# Patient Record
Sex: MALE | Race: WHITE | NOT HISPANIC OR LATINO | Employment: UNEMPLOYED | URBAN - METROPOLITAN AREA
[De-identification: names, ages, dates, MRNs, and addresses within clinical notes are randomized per-mention and may not be internally consistent; named-entity substitution may affect disease eponyms.]

---

## 2017-12-26 ENCOUNTER — HOSPITAL ENCOUNTER (EMERGENCY)
Facility: HOSPITAL | Age: 37
Discharge: HOME | End: 2017-12-26
Payer: MEDICAID

## 2017-12-26 VITALS
BODY MASS INDEX: 35.36 KG/M2 | HEART RATE: 108 BPM | TEMPERATURE: 101 F | OXYGEN SATURATION: 98 % | SYSTOLIC BLOOD PRESSURE: 118 MMHG | DIASTOLIC BLOOD PRESSURE: 64 MMHG | WEIGHT: 220 LBS | RESPIRATION RATE: 20 BRPM | HEIGHT: 66 IN

## 2017-12-26 DIAGNOSIS — B34.9 VIRAL ILLNESS: Primary | ICD-10-CM

## 2017-12-26 LAB
DEPRECATED S PYO AG THROAT QL EIA: NEGATIVE
FLUAV AG SPEC QL IA: NEGATIVE
FLUBV AG SPEC QL IA: NEGATIVE
SPECIMEN SOURCE: NORMAL

## 2017-12-26 PROCEDURE — 87081 CULTURE SCREEN ONLY: CPT

## 2017-12-26 PROCEDURE — 87880 STREP A ASSAY W/OPTIC: CPT

## 2017-12-26 PROCEDURE — 99283 EMERGENCY DEPT VISIT LOW MDM: CPT

## 2017-12-26 PROCEDURE — 25000003 PHARM REV CODE 250: Performed by: NURSE PRACTITIONER

## 2017-12-26 PROCEDURE — 87400 INFLUENZA A/B EACH AG IA: CPT | Mod: 59

## 2017-12-26 RX ORDER — ONDANSETRON 4 MG/1
4 TABLET, ORALLY DISINTEGRATING ORAL
Status: COMPLETED | OUTPATIENT
Start: 2017-12-26 | End: 2017-12-26

## 2017-12-26 RX ORDER — BUPRENORPHINE AND NALOXONE 8; 2 MG/1; MG/1
FILM, SOLUBLE BUCCAL; SUBLINGUAL EVERY 6 HOURS PRN
COMMUNITY

## 2017-12-26 RX ORDER — GUAIFENESIN 100 MG/5ML
100-200 SOLUTION ORAL EVERY 4 HOURS PRN
Qty: 118 ML | Refills: 0 | Status: SHIPPED | OUTPATIENT
Start: 2017-12-26 | End: 2018-01-05

## 2017-12-26 RX ORDER — IBUPROFEN 600 MG/1
600 TABLET ORAL
Status: COMPLETED | OUTPATIENT
Start: 2017-12-26 | End: 2017-12-26

## 2017-12-26 RX ORDER — ONDANSETRON 4 MG/1
8 TABLET, ORALLY DISINTEGRATING ORAL EVERY 6 HOURS PRN
Qty: 12 TABLET | Refills: 0 | OUTPATIENT
Start: 2017-12-26 | End: 2018-08-27

## 2017-12-26 RX ADMIN — ONDANSETRON 4 MG: 4 TABLET, ORALLY DISINTEGRATING ORAL at 02:12

## 2017-12-26 RX ADMIN — IBUPROFEN 600 MG: 600 TABLET, FILM COATED ORAL at 02:12

## 2017-12-26 NOTE — ED NOTES
Patient states he has headache, fever, n/v/d since last night.    LOC: The patient is awake and alert; oriented x 3 and speaking appropriately.  APPEARANCE: Patient resting comfortably, patient is clean and well groomed  SKIN: warm and dry, normal skin turgor & moist mucus membranes, skin intact, no breakdown noted.  MUSCULOSKELETAL: Patient moving all extremities well, no obvious swelling or deformities noted  RESPIRATORY: Airway is open and patent, breath sounds clear throughout all lung fields; respirations are spontaneous, normal effort and rate  CARDIAC: Patient has a normal rate, no peripheral edema noted, capillary refill < 3 seconds; No complaints of chest pain   ABDOMEN: Soft and non tender to palpation, no distention noted. Bowel sounds present x 4.  N/V/D since last night.

## 2017-12-26 NOTE — ED PROVIDER NOTES
Encounter Date: 12/26/2017       History     Chief Complaint   Patient presents with    Emesis     Pt states started with vomiting, chills and fever last pm.  Took 2000mg tylenol approx 1 hour PTA.      37-year-old male presents to the emergency room complaining of vomiting, body aches, fever, cough and congestion for the past 2 days.  States his daughter was seen 3 days ago for a viral illness.  Has been taking Tylenol with little relief.  Patient reports vomiting once today.  Reports generalized abdominal cramping.  Denies any diarrhea.  Denies blood in vomit or stool.          Review of patient's allergies indicates:  No Known Allergies  History reviewed. No pertinent past medical history.  No past surgical history on file.  History reviewed. No pertinent family history.  Social History   Substance Use Topics    Smoking status: Not on file    Smokeless tobacco: Not on file    Alcohol use Not on file     Review of Systems   Constitutional: Positive for chills, fatigue and fever.   HENT: Positive for congestion. Negative for sore throat.    Respiratory: Positive for cough. Negative for shortness of breath.    Cardiovascular: Negative for chest pain.   Gastrointestinal: Positive for nausea and vomiting.   Genitourinary: Negative for dysuria.   Musculoskeletal: Negative for back pain.   Skin: Negative for rash.   Neurological: Negative for weakness.   Hematological: Does not bruise/bleed easily.   All other systems reviewed and are negative.      Physical Exam     Initial Vitals [12/26/17 1411]   BP Pulse Resp Temp SpO2   118/64 108 20 (!) 101.4 °F (38.6 °C) 98 %      MAP       82         Physical Exam    Nursing note and vitals reviewed.  Constitutional: He appears well-developed and well-nourished. He is not diaphoretic. No distress.   HENT:   Head: Normocephalic and atraumatic.   Right Ear: Tympanic membrane normal.   Left Ear: Tympanic membrane normal.   Nose: Rhinorrhea present.   Eyes: Conjunctivae are  normal.   Neck: Normal range of motion. Neck supple.   Cardiovascular: Normal rate and regular rhythm.   Pulmonary/Chest: Breath sounds normal. No respiratory distress. He exhibits no tenderness.   Abdominal: Soft. He exhibits no distension. There is no tenderness.   Musculoskeletal: Normal range of motion. He exhibits no tenderness.   Neurological: He is alert and oriented to person, place, and time. He has normal strength. No cranial nerve deficit or sensory deficit.   Skin: Skin is warm and dry. Capillary refill takes less than 2 seconds.   Psychiatric: He has a normal mood and affect. His behavior is normal. Judgment and thought content normal.         ED Course   Procedures  Labs Reviewed   THROAT SCREEN, RAPID   CULTURE, STREP A,  THROAT   INFLUENZA A AND B ANTIGEN             Medical Decision Making:   Initial Assessment:   37-year-old male presents to the emergency room complaining of vomiting, body aches, fever, cough and congestion for the past 2 days.  States his daughter was seen 3 days ago for a viral illness.  Has been taking Tylenol with little relief.  Patient reports vomiting once today.  Reports generalized abdominal cramping.  Denies any diarrhea.  Denies blood in vomit or stool.  Differential Diagnosis:   Gastritis, viral illness colitis, appendicitis, IBS, strep throat, ileus, sepsis  Clinical Tests:   Lab Tests: Ordered and Reviewed  ED Management:  Strep screen negative.  Influenza screen is negative.  Patient tolerated fluids well in the emergency room.  Temp improved with medication.  Instructed to hydrate well take NSAID Tylenol or Motrin as needed for fever.  Prescribe Zofran and a cough medication.  Patient instructed to follow-up with primary care doctor in 2-3 days                   ED Course      Clinical Impression:   The encounter diagnosis was Viral illness.                           Sofie Arango NP  12/27/17 [id number]

## 2017-12-29 LAB — BACTERIA THROAT CULT: NORMAL

## 2018-01-18 ENCOUNTER — HOSPITAL ENCOUNTER (EMERGENCY)
Facility: HOSPITAL | Age: 38
Discharge: HOME | End: 2018-01-18
Attending: EMERGENCY MEDICINE
Payer: MEDICAID

## 2018-01-18 VITALS
DIASTOLIC BLOOD PRESSURE: 73 MMHG | HEIGHT: 66 IN | RESPIRATION RATE: 18 BRPM | HEART RATE: 101 BPM | TEMPERATURE: 101 F | WEIGHT: 225 LBS | BODY MASS INDEX: 36.16 KG/M2 | OXYGEN SATURATION: 96 % | SYSTOLIC BLOOD PRESSURE: 131 MMHG

## 2018-01-18 DIAGNOSIS — J01.90 ACUTE NON-RECURRENT SINUSITIS, UNSPECIFIED LOCATION: Primary | ICD-10-CM

## 2018-01-18 PROCEDURE — 99283 EMERGENCY DEPT VISIT LOW MDM: CPT

## 2018-01-18 RX ORDER — AMOXICILLIN 500 MG/1
500 CAPSULE ORAL 3 TIMES DAILY
Qty: 21 CAPSULE | Refills: 0 | Status: SHIPPED | OUTPATIENT
Start: 2018-01-18 | End: 2018-01-25

## 2018-01-18 RX ORDER — PREDNISONE 20 MG/1
40 TABLET ORAL DAILY
Qty: 10 TABLET | Refills: 0 | Status: SHIPPED | OUTPATIENT
Start: 2018-01-18 | End: 2018-01-23

## 2018-01-18 NOTE — ED PROVIDER NOTES
Encounter Date: 1/18/2018       History     Chief Complaint   Patient presents with    Fever     PT reports fever, body aches and sinus pressure behind his eyes since yesterday. Motrin taken a hour ago    Generalized Body Aches     The history is provided by the patient.   Sinusitis    This is a new problem. The current episode started yesterday. The problem has been unchanged. The pain is at a severity of 3/10. The pain has been fluctuating since onset. Associated symptoms include chills, congestion and sinus pressure. Pertinent negatives include no sweats, no ear pain, no sore throat, no swollen glands, no cough and no shortness of breath. He has tried nothing for the symptoms.     Review of patient's allergies indicates:  No Known Allergies  History reviewed. No pertinent past medical history.  History reviewed. No pertinent surgical history.  History reviewed. No pertinent family history.  Social History   Substance Use Topics    Smoking status: Current Every Day Smoker     Packs/day: 1.00     Types: Cigarettes    Smokeless tobacco: Never Used    Alcohol use No     Review of Systems   Constitutional: Positive for chills.   HENT: Positive for congestion and sinus pressure. Negative for ear pain and sore throat.    Respiratory: Negative for cough and shortness of breath.    All other systems reviewed and are negative.      Physical Exam     Initial Vitals [01/18/18 1451]   BP Pulse Resp Temp SpO2   131/73 101 18 (!) 100.6 °F (38.1 °C) 96 %      MAP       92.33         Physical Exam    Nursing note and vitals reviewed.  Constitutional: He appears well-developed and well-nourished.   HENT:   Head: Normocephalic and atraumatic.   Right Ear: Hearing, tympanic membrane, external ear and ear canal normal.   Left Ear: Hearing, tympanic membrane, external ear and ear canal normal.   Nose: Mucosal edema and rhinorrhea present. No septal deviation or nasal septal hematoma. No epistaxis.   Mouth/Throat: Uvula is  midline.   Eyes: EOM are normal.   Neck: Normal range of motion. Neck supple.   Cardiovascular: Normal rate, regular rhythm, normal heart sounds and intact distal pulses.   Pulmonary/Chest: Breath sounds normal.   Abdominal: Soft.   Musculoskeletal: Normal range of motion.   Neurological: He is alert and oriented to person, place, and time.   Skin: Skin is warm and dry. Capillary refill takes less than 2 seconds.   Psychiatric: He has a normal mood and affect. His behavior is normal. Judgment and thought content normal.         ED Course   Procedures  Labs Reviewed - No data to display                            ED Course      Clinical Impression:   The encounter diagnosis was Acute non-recurrent sinusitis, unspecified location.    Disposition:   Disposition: Discharged  Condition: Stable                        Maricruz Giordano MD  01/18/18 6295

## 2018-01-18 NOTE — ED TRIAGE NOTES
PT reports fever, body aches and sinus pressure behind his eyes since yesterday. Motrin taken a hour ago

## 2018-01-18 NOTE — ED NOTES
Patient states he has fever and generalized body aches since last night.  Motrin last taken at 1400.  He denies any other symptoms.    LOC: The patient is awake and alert; oriented x 3 and speaking appropriately.  APPEARANCE: Patient resting comfortably, patient is clean and well groomed  SKIN: warm and dry, normal skin turgor & moist mucus membranes, skin intact, no breakdown noted.  MUSCULOSKELETAL: Patient moving all extremities well, no obvious swelling or deformities noted  RESPIRATORY: Airway is open and patent, breath sounds clear throughout all lung fields; respirations are spontaneous, normal effort and rate  CARDIAC: Patient has a normal rate, no peripheral edema noted, capillary refill < 3 seconds; No complaints of chest pain   ABDOMEN: Soft and non tender to palpation, no distention noted. Bowel sounds present x 4

## 2018-08-27 ENCOUNTER — HOSPITAL ENCOUNTER (EMERGENCY)
Facility: HOSPITAL | Age: 38
Discharge: HOME | End: 2018-08-27
Attending: SURGERY
Payer: MEDICAID

## 2018-08-27 VITALS
WEIGHT: 225 LBS | OXYGEN SATURATION: 97 % | DIASTOLIC BLOOD PRESSURE: 87 MMHG | BODY MASS INDEX: 36.32 KG/M2 | TEMPERATURE: 98 F | RESPIRATION RATE: 16 BRPM | SYSTOLIC BLOOD PRESSURE: 141 MMHG | HEART RATE: 82 BPM

## 2018-08-27 DIAGNOSIS — M53.3 COCCYX PAIN: Primary | ICD-10-CM

## 2018-08-27 DIAGNOSIS — R52 PAIN: ICD-10-CM

## 2018-08-27 PROCEDURE — 63600175 PHARM REV CODE 636 W HCPCS: Performed by: NURSE PRACTITIONER

## 2018-08-27 PROCEDURE — 96372 THER/PROPH/DIAG INJ SC/IM: CPT

## 2018-08-27 PROCEDURE — 25000003 PHARM REV CODE 250: Performed by: NURSE PRACTITIONER

## 2018-08-27 PROCEDURE — 99283 EMERGENCY DEPT VISIT LOW MDM: CPT | Mod: 25

## 2018-08-27 RX ORDER — KETOROLAC TROMETHAMINE 30 MG/ML
30 INJECTION, SOLUTION INTRAMUSCULAR; INTRAVENOUS
Status: COMPLETED | OUTPATIENT
Start: 2018-08-27 | End: 2018-08-27

## 2018-08-27 RX ORDER — DEXAMETHASONE SODIUM PHOSPHATE 4 MG/ML
4 INJECTION, SOLUTION INTRA-ARTICULAR; INTRALESIONAL; INTRAMUSCULAR; INTRAVENOUS; SOFT TISSUE ONCE
Status: DISCONTINUED | OUTPATIENT
Start: 2018-08-27 | End: 2018-08-27

## 2018-08-27 RX ORDER — METHYLPREDNISOLONE 4 MG/1
TABLET ORAL
Qty: 1 PACKAGE | Refills: 0 | Status: SHIPPED | OUTPATIENT
Start: 2018-08-27 | End: 2018-09-17

## 2018-08-27 RX ORDER — SYRING-NEEDL,DISP,INSUL,0.3 ML 29 G X1/2"
148 SYRINGE, EMPTY DISPOSABLE MISCELLANEOUS
Status: COMPLETED | OUTPATIENT
Start: 2018-08-27 | End: 2018-08-27

## 2018-08-27 RX ORDER — DEXAMETHASONE SODIUM PHOSPHATE 4 MG/ML
4 INJECTION, SOLUTION INTRA-ARTICULAR; INTRALESIONAL; INTRAMUSCULAR; INTRAVENOUS; SOFT TISSUE ONCE
Status: COMPLETED | OUTPATIENT
Start: 2018-08-27 | End: 2018-08-27

## 2018-08-27 RX ADMIN — KETOROLAC TROMETHAMINE 30 MG: 30 INJECTION, SOLUTION INTRAMUSCULAR at 02:08

## 2018-08-27 RX ADMIN — Medication 148 ML: at 03:08

## 2018-08-27 RX ADMIN — DEXAMETHASONE SODIUM PHOSPHATE 4 MG: 4 INJECTION, SOLUTION INTRAMUSCULAR; INTRAVENOUS at 02:08

## 2018-08-27 NOTE — DISCHARGE INSTRUCTIONS
May also take Tylenol and ibuprofen as needed for pain.  Take 1/2 of bottle of Mag citrate given to at discharge today.  If no bowel movement by tomorrow morning, take 2nd half a bottle.  Make sure to drink plenty of water and incorporate fibrin tear diet which includes fruits, vegetables, and beans.

## 2018-08-27 NOTE — ED PROVIDER NOTES
Encounter Date: 8/27/2018       History     Chief Complaint   Patient presents with    Back Pain     lower back pain chronic, due to an injury at age 16. tgis episode of pain started 3 days ago     38-year-old male here today complaining of coccyx pain.  Patient reports a longstanding history of coccyx pain related to a previous injury. He states however, the pain has been worse over the last 3 days.  He denies any inciting event or injury that could cause the pain. Patient reports he does get pain relief with ibuprofen, his prescribed Suboxone, as well as ice.  Patient denies any abdominal pain, urinary complaints, or pain radiation to his bilateral lower extremities. Patient's significant other, who is also at bedside, reports her repeat concerned about patient having a cyst to his coccyx area.          Review of patient's allergies indicates:  No Known Allergies  History reviewed. No pertinent past medical history.  History reviewed. No pertinent surgical history.  History reviewed. No pertinent family history.  Social History     Tobacco Use    Smoking status: Current Every Day Smoker     Packs/day: 1.00     Types: Cigarettes    Smokeless tobacco: Never Used   Substance Use Topics    Alcohol use: No    Drug use: No     Review of Systems   Respiratory: Negative for shortness of breath.    Cardiovascular: Negative for chest pain.   Gastrointestinal: Positive for constipation. Negative for abdominal pain, nausea and vomiting.   Musculoskeletal: Positive for back pain. Negative for gait problem.   Skin: Negative for wound.   All other systems reviewed and are negative.      Physical Exam     Initial Vitals [08/27/18 1417]   BP Pulse Resp Temp SpO2   (!) 141/87 82 16 98.2 °F (36.8 °C) 97 %      MAP       --         Physical Exam    Nursing note and vitals reviewed.  Constitutional: He appears well-developed and well-nourished. No distress.   HENT:   Head: Normocephalic and atraumatic.   Right Ear: External ear  normal.   Left Ear: External ear normal.   Nose: Nose normal.   Mouth/Throat: Oropharynx is clear and moist.   Eyes: Conjunctivae and EOM are normal.   Neck: Normal range of motion.   Cardiovascular: Intact distal pulses.   No edema to bilateral lower extremities.   Pulmonary/Chest: No respiratory distress.   Abdominal: Soft. There is no tenderness.   Musculoskeletal:   No thoracic or lumbar vertebral tenderness, step-off, or crepitus. Mild coccyx tenderness without crepitus or swelling.   Neurological: He is alert and oriented to person, place, and time.   Skin: Skin is warm and dry.   Psychiatric: He has a normal mood and affect. His behavior is normal. Thought content normal.         ED Course   Procedures  Labs Reviewed - No data to display       Imaging Results          X-Ray Sacrum And Coccyx (Final result)  Result time 08/27/18 14:45:02    Final result by Sandip Nunn MD (08/27/18 14:45:02)                 Impression:      No acute fracture or dislocation.    See above.      Electronically signed by: Sandip Nunn MD  Date:    08/27/2018  Time:    14:45             Narrative:    EXAMINATION:  XR SACRUM AND COCCYX    CLINICAL HISTORY:  XR SACRUM AND COCCYXPain, unspecified    COMPARISON:  None    FINDINGS:  Three views of the sacrum/coccyx were obtained.    No evidence of acute fracture or dislocation.  Bony mineralization is normal.  Soft tissues are unremarkable.   Moderate constipation.  Chronic appearing deformity at the tip of the sacrum without acute fracture identified.                                 Medical Decision Making:   Initial Assessment:   Chronic coccyx pain, worse the past 2-3 days.    Differential Diagnosis:   Coccyx fracture, constipation, UTI, coccydynia  Clinical Tests:   Radiological Study: Ordered and Reviewed  ED Management:  No acute appearing abnormality seen on imaging.  Patient does have moderate constipation on imaging.  Discussion with patient reports that patient has been  feeling constipated.  Believe constipation is a factor and patient increase coccyx pain.  Patient received Decadron here in the ED for inflammation and will be started on a Medrol Dosepak which he is to start tomorrow.  Patient also given bottle of Mag citrate with instructions.  Patient to follow up with his primary care provider if symptoms persist or return to ER if symptoms worsen or change.  Patient verbalized agreement and understanding of treatment plan prior to discharge.                      Clinical impression:    1.  Coccyx pain.  2.  Constipation.                             Hyacinth Hassan NP  08/27/18 5600

## 2018-08-27 NOTE — ED NOTES
C/o tail bone pain x3 days. Denies any recent injury. Pt has chronic pain to this area due to accident 15 years ago. Taking motrin at home with relief. Ambulates with steady gate.

## 2018-09-06 ENCOUNTER — HOSPITAL ENCOUNTER (EMERGENCY)
Facility: HOSPITAL | Age: 38
Discharge: HOME | End: 2018-09-06
Attending: EMERGENCY MEDICINE
Payer: MEDICAID

## 2018-09-06 VITALS
TEMPERATURE: 99 F | DIASTOLIC BLOOD PRESSURE: 71 MMHG | OXYGEN SATURATION: 99 % | RESPIRATION RATE: 18 BRPM | HEART RATE: 88 BPM | SYSTOLIC BLOOD PRESSURE: 116 MMHG

## 2018-09-06 DIAGNOSIS — L02.91 ABSCESS: Primary | ICD-10-CM

## 2018-09-06 LAB
ALBUMIN SERPL BCP-MCNC: 3.8 G/DL
ALP SERPL-CCNC: 65 U/L
ALT SERPL W/O P-5'-P-CCNC: 27 U/L
ANION GAP SERPL CALC-SCNC: 9 MMOL/L
AST SERPL-CCNC: 19 U/L
BASOPHILS # BLD AUTO: 0.06 K/UL
BASOPHILS NFR BLD: 0.4 %
BILIRUB SERPL-MCNC: 0.3 MG/DL
BUN SERPL-MCNC: 19 MG/DL
BUN SERPL-MCNC: 21 MG/DL (ref 6–30)
CALCIUM SERPL-MCNC: 9.8 MG/DL
CHLORIDE SERPL-SCNC: 102 MMOL/L
CHLORIDE SERPL-SCNC: 99 MMOL/L (ref 95–110)
CO2 SERPL-SCNC: 26 MMOL/L
CREAT SERPL-MCNC: 1 MG/DL
CREAT SERPL-MCNC: 1 MG/DL (ref 0.5–1.4)
DIFFERENTIAL METHOD: ABNORMAL
EOSINOPHIL # BLD AUTO: 0.3 K/UL
EOSINOPHIL NFR BLD: 2 %
ERYTHROCYTE [DISTWIDTH] IN BLOOD BY AUTOMATED COUNT: 13.3 %
EST. GFR  (AFRICAN AMERICAN): >60 ML/MIN/1.73 M^2
EST. GFR  (NON AFRICAN AMERICAN): >60 ML/MIN/1.73 M^2
GLUCOSE SERPL-MCNC: 98 MG/DL
GLUCOSE SERPL-MCNC: 98 MG/DL (ref 70–110)
HCT VFR BLD AUTO: 38.6 %
HCT VFR BLD CALC: 37 %PCV (ref 36–54)
HGB BLD-MCNC: 12.8 G/DL
IMM GRANULOCYTES # BLD AUTO: 0.03 K/UL
IMM GRANULOCYTES NFR BLD AUTO: 0.2 %
LACTATE SERPL-SCNC: 0.9 MMOL/L
LYMPHOCYTES # BLD AUTO: 4.8 K/UL
LYMPHOCYTES NFR BLD: 32.9 %
MCH RBC QN AUTO: 29 PG
MCHC RBC AUTO-ENTMCNC: 33.2 G/DL
MCV RBC AUTO: 88 FL
MONOCYTES # BLD AUTO: 1.4 K/UL
MONOCYTES NFR BLD: 9.5 %
NEUTROPHILS # BLD AUTO: 8 K/UL
NEUTROPHILS NFR BLD: 55 %
NRBC BLD-RTO: 0 /100 WBC
PLATELET # BLD AUTO: 348 K/UL
PMV BLD AUTO: 9.7 FL
POC IONIZED CALCIUM: 1.15 MMOL/L (ref 1.06–1.42)
POC TCO2 (MEASURED): 26 MMOL/L (ref 23–29)
POTASSIUM BLD-SCNC: 4 MMOL/L (ref 3.5–5.1)
POTASSIUM SERPL-SCNC: 4 MMOL/L
PROT SERPL-MCNC: 7.6 G/DL
RBC # BLD AUTO: 4.41 M/UL
SAMPLE: NORMAL
SODIUM BLD-SCNC: 137 MMOL/L (ref 136–145)
SODIUM SERPL-SCNC: 137 MMOL/L
WBC # BLD AUTO: 14.52 K/UL

## 2018-09-06 PROCEDURE — 99284 EMERGENCY DEPT VISIT MOD MDM: CPT | Mod: 25,,, | Performed by: EMERGENCY MEDICINE

## 2018-09-06 PROCEDURE — 96365 THER/PROPH/DIAG IV INF INIT: CPT

## 2018-09-06 PROCEDURE — 96366 THER/PROPH/DIAG IV INF ADDON: CPT

## 2018-09-06 PROCEDURE — 63600175 PHARM REV CODE 636 W HCPCS: Performed by: EMERGENCY MEDICINE

## 2018-09-06 PROCEDURE — 25000003 PHARM REV CODE 250: Performed by: EMERGENCY MEDICINE

## 2018-09-06 PROCEDURE — 80053 COMPREHEN METABOLIC PANEL: CPT

## 2018-09-06 PROCEDURE — 25500020 PHARM REV CODE 255: Performed by: EMERGENCY MEDICINE

## 2018-09-06 PROCEDURE — 85025 COMPLETE CBC W/AUTO DIFF WBC: CPT

## 2018-09-06 PROCEDURE — 87040 BLOOD CULTURE FOR BACTERIA: CPT | Mod: 59

## 2018-09-06 PROCEDURE — 10061 I&D ABSCESS COMP/MULTIPLE: CPT

## 2018-09-06 PROCEDURE — 10061 I&D ABSCESS COMP/MULTIPLE: CPT | Mod: ,,, | Performed by: EMERGENCY MEDICINE

## 2018-09-06 PROCEDURE — 99284 EMERGENCY DEPT VISIT MOD MDM: CPT | Mod: 25

## 2018-09-06 PROCEDURE — 83605 ASSAY OF LACTIC ACID: CPT

## 2018-09-06 RX ORDER — SULFAMETHOXAZOLE AND TRIMETHOPRIM 800; 160 MG/1; MG/1
2 TABLET ORAL 2 TIMES DAILY
Qty: 40 TABLET | Refills: 0 | Status: SHIPPED | OUTPATIENT
Start: 2018-09-06

## 2018-09-06 RX ORDER — VANCOMYCIN 2 GRAM/500 ML IN 0.9 % SODIUM CHLORIDE INTRAVENOUS
2000
Status: COMPLETED | OUTPATIENT
Start: 2018-09-06 | End: 2018-09-06

## 2018-09-06 RX ORDER — LIDOCAINE HYDROCHLORIDE 10 MG/ML
10 INJECTION, SOLUTION EPIDURAL; INFILTRATION; INTRACAUDAL; PERINEURAL
Status: COMPLETED | OUTPATIENT
Start: 2018-09-06 | End: 2018-09-06

## 2018-09-06 RX ORDER — CEPHALEXIN 500 MG/1
500 CAPSULE ORAL 4 TIMES DAILY
Qty: 40 CAPSULE | Refills: 0 | Status: SHIPPED | OUTPATIENT
Start: 2018-09-06 | End: 2018-09-16

## 2018-09-06 RX ADMIN — IOHEXOL 100 ML: 350 INJECTION, SOLUTION INTRAVENOUS at 04:09

## 2018-09-06 RX ADMIN — SODIUM CHLORIDE 1000 ML: 0.9 INJECTION, SOLUTION INTRAVENOUS at 04:09

## 2018-09-06 RX ADMIN — VANCOMYCIN HYDROCHLORIDE 2000 MG: 10 INJECTION, POWDER, LYOPHILIZED, FOR SOLUTION INTRAVENOUS at 04:09

## 2018-09-06 RX ADMIN — LIDOCAINE HYDROCHLORIDE 100 MG: 10 INJECTION, SOLUTION EPIDURAL; INFILTRATION; INTRACAUDAL; PERINEURAL at 05:09

## 2018-09-06 NOTE — ED TRIAGE NOTES
Pt presents to ED c/o abscess to left gluteus. Pt reports that it has been present for about 10 days. States that pain and redness is spreading to left hip. Denies fever or chills.     LOC: Patient name and date of birth verified.  The patient is awake, alert and aware of environment with an appropriate affect, the patient is oriented x 3 and speaking appropriately.  Pt in NAD.    APPEARANCE: Patient resting comfortably and in no acute distress, patient is clean and well groomed, patient's clothing is properly fastened.  SKIN: The skin is warm and dry, color consistent with ethnicity, patient has normal skin turgor and moist mucus membranes, skin intact, no breakdown or brusing noted. Abscess to left gluteus. Redness and swelling noted.  MUSCULOSKELETAL: Patient moving all extremities well, no obvious swelling or deformities noted.  RESPIRATORY: Airway is open and patent, respirations are spontaneous, patient has a normal effort and rate, no accessory muscle use noted.  CARDIAC: Patient has a normal rate and rhythm, no periphreal edema noted, capillary refill < 3 seconds.  ABDOMEN: Soft and non tender to palpation, no distention noted. Bowel sounds present in all four quadrants.  NEUROLOGIC: Eyes open spontaneously, behavior appropriate to situation, follows commands, facial expression symmetrical, bilateral hand grasp equal and even, purposeful motor response noted, normal sensation in all extremities when touched with a finger.

## 2018-09-06 NOTE — ED PROVIDER NOTES
Encounter Date: 9/6/2018  SCRIBE #1 NOTE: I, Matthew Vega, am scribing for, and in the presence of,  Giuseppe Daniel MD.  I, Dr. Bigg Daneil, personally performed the services described in this documentation. All medical record entries made by the scribe were at my direction and in my presence.  I have reviewed the chart and agree that the record reflects my personal performance and is accurate and complete.  Bigg Daniel MD.  5:52 AM 09/06/2018         History     Chief Complaint   Patient presents with    Cyst     Pt presents to ED c/o cyst to tailbone x 10 days. States pain and swelling is progressing to L hip.        The patient is a 38 y.o. male who presents to the ED with a complaint of cyst to buttocks area. He reports it has been present for 10 days, painful to touch, worse with sitting on the area. He states it has been progressively large and painful across his entire left buttocks. The patient denies fever or vomiting.            Review of patient's allergies indicates:  No Known Allergies  History reviewed. No pertinent past medical history.  No past surgical history on file.  No family history on file.  Social History     Tobacco Use    Smoking status: Current Every Day Smoker     Packs/day: 1.00     Types: Cigarettes    Smokeless tobacco: Never Used   Substance Use Topics    Alcohol use: No    Drug use: No     Review of Systems   Constitutional: Negative for fever.   HENT: Negative for sore throat.    Respiratory: Negative for shortness of breath.    Cardiovascular: Negative for chest pain.   Gastrointestinal: Negative for nausea.   Genitourinary: Negative for dysuria.   Musculoskeletal: Negative for back pain.   Skin: Negative for rash.        Left buttocks   Neurological: Negative for weakness.   Hematological: Does not bruise/bleed easily.       Physical Exam     Initial Vitals [09/06/18 0340]   BP Pulse Resp Temp SpO2   134/81 102 18 98.9 °F (37.2 °C) 97 %      MAP       --         Physical  Exam    Nursing note and vitals reviewed.  Constitutional: He appears well-developed and well-nourished.   HENT:   Head: Normocephalic and atraumatic.   Eyes: EOM are normal. Pupils are equal, round, and reactive to light.   Neck: Normal range of motion.   Cardiovascular: Normal rate and regular rhythm.   Pulmonary/Chest: Breath sounds normal.   Abdominal: Soft. There is no tenderness.   Genitourinary:   Genitourinary Comments: Left gluteal cleft with induration and fluctuance, there is induration that extends to much of the left gluteus thierno, no visible head, open wound or discharge, there is no perirectal tenderness or induration,    Neurological: He is alert and oriented to person, place, and time.   Skin: Skin is warm and dry.   Psychiatric: He has a normal mood and affect. Thought content normal.         ED Course   I & D - Incision and Drainage  Date/Time: 2018 5:08 AM  Performed by: Giuseppe Daniel MD  Authorized by: Giuseppe Daniel MD   Consent Done: Yes  Consent: Verbal consent obtained.  Risks and benefits: risks, benefits and alternatives were discussed  Consent given by: patient  Patient identity confirmed: name,  and verbally with patient  Type: abscess  Body area: anogenital  Location details: gluteal cleft  Anesthesia: local infiltration    Anesthesia:  Local Anesthetic: lidocaine 1% with epinephrine  Anesthetic total: 3 mL  Scalpel size: 11  Incision type: single straight  Complexity: complex  Drainage: pus  Drainage amount: moderate  Wound treatment: incision,  drainage and  wound packed  Packing material: 1/4 in iodoform gauze  Complications: No  Patient tolerance: Patient tolerated the procedure well with no immediate complications        Labs Reviewed   CBC W/ AUTO DIFFERENTIAL - Abnormal; Notable for the following components:       Result Value    WBC 14.52 (*)     RBC 4.41 (*)     Hemoglobin 12.8 (*)     Hematocrit 38.6 (*)     Gran # (ANC) 8.0 (*)     Mono # 1.4 (*)     All other  components within normal limits   CULTURE, BLOOD   CULTURE, BLOOD   COMPREHENSIVE METABOLIC PANEL   LACTIC ACID, PLASMA   ISTAT PROCEDURE   ISTAT CHEM8          Imaging Results          CT Pelvis With Contrast (Final result)  Result time 09/06/18 05:19:22    Final result by Helio Kulkarni MD (09/06/18 05:19:22)                 Impression:      Nonspecific subcutaneous soft tissue density with mild surrounding soft tissue induration within the superior aspect of the gluteal cleft with dimensions as provided above.  This measures slightly greater than that of simple fluid.  Findings are nonspecific and could represent soft tissue infection/phlegmon in the appropriate clinical setting.  Additional considerations would include but are not limited to pilonidal sinus/cyst or subcutaneous/sebaceous cyst.  No evidence of associated intra-abdominal process.    Electronically signed by resident: Carlos Rosario  Date:    09/06/2018  Time:    04:42    Electronically signed by: Helio Kulkarni MD  Date:    09/06/2018  Time:    05:19             Narrative:    EXAMINATION:  CT PELVIS WITH  CONTRAST    CLINICAL HISTORY:  gluteal abscess;    TECHNIQUE:  CT evaluation of the pelvis after the administration of 100 cc intravenous Omnipaque 350.  No oral contrast administered.    COMPARISON:  Sacral radiograph 08/27/2018, CT abdomen pelvis 12/23/2011    FINDINGS:  There is a focal abnormal region of increased soft tissue density within the midline superior gluteal cleft, slightly asymmetric to the left.  There is mild surrounding soft tissue induration.  This measures approximately 3.0 x 3.4 x 4.6 cm (AP by transverse by craniocaudal dimensions.).  This measures greater than that of simple fluid.  No significant peripheral enhancement is appreciated.  No associated subcutaneous gas is present.  No additional focal soft tissue abnormalities identified within the gluteal soft tissues.  The adjacent sacrococcygeal structures demonstrate no  evidence of erosive change or aggressive periosteal reaction.    Remaining intrapelvic contents within normal limits.  Appendix appears within normal limits.  No free fluid within the pelvis.  Incidental note made of mild fatty involution of the right semitendinosus.                                 Medical Decision Making:   Initial Assessment:         ED Management:      I&D tolerated well, he does have elevated  leukocytes but no abnormal vital signs. He is feeling better since the I&D. He will be discharged home on oral antibiotics with a plan for wound check in 2 days.                      Clinical Impression:   The encounter diagnosis was Abscess.      Disposition:   Disposition: Discharged  Condition: Stable                        Giuseppe Daniel MD  09/06/18 0552

## 2018-09-11 LAB
BACTERIA BLD CULT: NORMAL
BACTERIA BLD CULT: NORMAL

## 2025-07-02 ENCOUNTER — HOSPITAL ENCOUNTER (EMERGENCY)
Facility: HOSPITAL | Age: 45
Discharge: HOME | End: 2025-07-02
Attending: STUDENT IN AN ORGANIZED HEALTH CARE EDUCATION/TRAINING PROGRAM
Payer: MEDICAID

## 2025-07-02 VITALS
HEART RATE: 86 BPM | TEMPERATURE: 98 F | DIASTOLIC BLOOD PRESSURE: 90 MMHG | WEIGHT: 225 LBS | RESPIRATION RATE: 19 BRPM | SYSTOLIC BLOOD PRESSURE: 140 MMHG | BODY MASS INDEX: 36.16 KG/M2 | OXYGEN SATURATION: 97 % | HEIGHT: 66 IN

## 2025-07-02 DIAGNOSIS — B86 SCABIES: Primary | ICD-10-CM

## 2025-07-02 PROCEDURE — 99283 EMERGENCY DEPT VISIT LOW MDM: CPT | Mod: ER

## 2025-07-02 RX ORDER — PERMETHRIN 50 MG/G
CREAM TOPICAL
Qty: 60 G | Refills: 0 | Status: SHIPPED | OUTPATIENT
Start: 2025-07-02

## 2025-07-02 NOTE — ED PROVIDER NOTES
NAME:  Nav Villa  CSN:     [id number]  MRN:    [id number]  ADMIT DATE: 7/2/2025        eMERGENCY dEPARTMENT eNCOUnter    CHIEF COMPLAINT    Chief Complaint   Patient presents with    Blister     Blisters to pati hand and feet that started yesterday. Pt reports sister and brother in law also have rash       HPI    Nav Villa is a 45 y.o. male with a past medical history of  has no past medical history on file.     he presents to the ED due to pruritic papules noticed to the fingers and feet.  States that his brother-in-law and sister have similar rash.  They do not live together.  Has been traveling frequently in staying at held house.  Denies any camping or outdoor exposures.  Nobody in his household has a similar rash.  No fevers.  No other symptoms reported.    HPI       PAST MEDICAL HISTORY  History reviewed. No pertinent past medical history.    SURGICAL HISTORY    History reviewed. No pertinent surgical history.    FAMILY HISTORY    No family history on file.    SOCIAL HISTORY    Social History     Socioeconomic History    Marital status:    Tobacco Use    Smoking status: Every Day     Current packs/day: 1.00     Types: Cigarettes    Smokeless tobacco: Never   Substance and Sexual Activity    Alcohol use: No    Drug use: No       MEDICATIONS  Current Outpatient Medications   Medication Instructions    buprenorphine-naloxone 8-2 mg Film Sublingual, Every 6 hours PRN    permethrin (ELIMITE) 5 % cream Apply 5% cream to all areas of the body from the neck down, then wash off after 8 to 14 hours    sulfamethoxazole-trimethoprim 800-160mg (BACTRIM DS) 800-160 mg Tab 2 tablets, Oral, 2 times daily       ALLERGIES    Review of patient's allergies indicates:  No Known Allergies      REVIEW OF SYSTEMS   Review of Systems       PHYSICAL EXAM    Reviewed Triage Note    VITAL SIGNS:   ED Triage Vitals [07/02/25 1012]   Encounter Vitals Group      BP (!) 140/90      Girls Systolic BP Percentile       Girls  "Diastolic BP Percentile       Boys Systolic BP Percentile       Boys Diastolic BP Percentile       Pulse 86      Resp 19      Temp 98.1 °F (36.7 °C)      Temp Source Oral      SpO2 97 %      Weight 225 lb      Height 5' 6"      Head Circumference       Peak Flow       Pain Score       Pain Loc       Pain Education       Exclude from Growth Chart        Patient Vitals for the past 24 hrs:   BP Temp Temp src Pulse Resp SpO2 Height Weight   07/02/25 1012 (!) 140/90 98.1 °F (36.7 °C) Oral 86 19 97 % 5' 6" (1.676 m) 102.1 kg (225 lb)       Physical Exam    Nursing note and vitals reviewed.  Constitutional: He appears well-developed and well-nourished.   HENT:   Head: Normocephalic and atraumatic.   Eyes: EOM are normal. Pupils are equal, round, and reactive to light.   Neck: Neck supple.   Normal range of motion.  Cardiovascular:  Normal rate.           Pulmonary/Chest: No respiratory distress.   Musculoskeletal:         General: Normal range of motion.      Cervical back: Normal range of motion and neck supple.     Neurological: He is alert and oriented to person, place, and time.   Skin: Skin is warm and dry.   Linear papules noticed over the bilateral hands and fingers.  Primarily over the bilateral index fingers and the medial aspect of the feet.  No additional involvement.  No overlying erythema or warmth.  No swelling of the digits.   Psychiatric: He has a normal mood and affect.          EKG     Interpreted by EM physician if performed:               LABS  Pertinent labs reviewed. (See chart for details)   Labs Reviewed - No data to display      RADIOLOGY          Imaging Results    None           PROCEDURES    Procedures      ED COURSE & MEDICAL DECISION MAKING    Pertinent Labs & Imaging studies reviewed. (See chart for details and specific orders.)          Summary of review of records:    reviewed, consistently on Suboxone.    Medical Decision Making  Risk  Prescription drug management.      Nav Villa " is a 45 y.o. male who presents with concern for pruritic skin rash.    Differential includes but is not limited to Scabies, hand-foot and mouth, Danis mountain spotted fever.  Presentation is most consistent with scabies.  Discussed preventative care at home as well as treatment.  Questions addressed and patient voiced understanding.          Medications - No data to display               FINAL IMPRESSION    Final diagnoses:  [B86] Scabies (Primary)       DISPOSITION  Patient discharge in stable condition        ED Prescriptions       Medication Sig Dispense Start Date End Date Auth. Provider    permethrin (ELIMITE) 5 % cream Apply 5% cream to all areas of the body from the neck down, then wash off after 8 to 14 hours 60 g 7/2/2025 -- Mariel Poon, DO          Follow-up Information       Follow up With Specialties Details Why Contact Info    Edson Zamora MD Family Medicine Schedule an appointment as soon as possible for a visit   [address] [place], [place] [id number] [phone]                DISCLAIMER: This note was prepared with [employer] voice recognition transcription software. Garbled syntax, mangled pronouns, and other bizarre constructions may be attributed to that software system.             Mariel Poon DO  07/02/25 103

## 2025-07-02 NOTE — DISCHARGE INSTRUCTIONS
How did I get scabies? -- Scabies spreads easily between people who are in close contact. This can happen through sexual activity, or just between people who live together. It is not usually spread by quick contact with another person, like a hug or handshake.    Can scabies be prevented? -- If someone in your home has scabies, there are things you can do to prevent others from getting it:   Once the person has started treatment, wash all of the clothes everyone in the home wore in the last 4 to 5 days. Use hot water. Then, dry them in a dryer on high heat.   Wash any bedclothes (sheets and blankets) or towels people in your home have touched.   If a child is getting treated, wash their stuffed animals and soft toys.   Dry cleaning will also get rid of scabies mites. Any bedding, clothing, or towels that you cannot wash or dry clean should be placed in a sealed plastic bag for at least 3 days. Scabies mites usually die without contact with human skin after a few days.